# Patient Record
Sex: FEMALE | Race: AMERICAN INDIAN OR ALASKA NATIVE
[De-identification: names, ages, dates, MRNs, and addresses within clinical notes are randomized per-mention and may not be internally consistent; named-entity substitution may affect disease eponyms.]

---

## 2017-07-07 ENCOUNTER — HOSPITAL ENCOUNTER (EMERGENCY)
Dept: HOSPITAL 31 - C.ER | Age: 57
Discharge: HOME | End: 2017-07-07
Payer: COMMERCIAL

## 2017-07-07 VITALS — HEART RATE: 78 BPM | SYSTOLIC BLOOD PRESSURE: 138 MMHG | RESPIRATION RATE: 16 BRPM | DIASTOLIC BLOOD PRESSURE: 75 MMHG

## 2017-07-07 VITALS — OXYGEN SATURATION: 96 %

## 2017-07-07 VITALS — TEMPERATURE: 98 F

## 2017-07-07 VITALS — BODY MASS INDEX: 26.6 KG/M2

## 2017-07-07 DIAGNOSIS — S39.012A: Primary | ICD-10-CM
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
## 2017-07-07 DIAGNOSIS — S09.90XA: ICD-10-CM

## 2017-07-07 DIAGNOSIS — S16.1XXA: ICD-10-CM
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
## 2018-03-28 ENCOUNTER — HOSPITAL ENCOUNTER (EMERGENCY)
Dept: HOSPITAL 42 - ED | Age: 58
Discharge: HOME | End: 2018-03-28
Payer: MEDICAID

## 2018-03-28 VITALS — BODY MASS INDEX: 26.6 KG/M2

## 2018-03-28 DIAGNOSIS — K92.1: Primary | ICD-10-CM

## 2018-03-28 DIAGNOSIS — I10: ICD-10-CM

## 2018-03-28 LAB
ALBUMIN SERPL-MCNC: 4.5 G/DL (ref 3–4.8)
ALBUMIN/GLOB SERPL: 1.3 {RATIO} (ref 1.1–1.8)
ALT SERPL-CCNC: 22 U/L (ref 7–56)
AMORPH SED URNS QL MICRO: (no result)
APPEARANCE UR: CLEAR
AST SERPL-CCNC: 20 U/L (ref 14–36)
BACTERIA #/AREA URNS HPF: (no result) /[HPF]
BASOPHILS # BLD AUTO: 0.03 K/MM3 (ref 0–2)
BASOPHILS NFR BLD: 0.6 % (ref 0–3)
BILIRUB UR-MCNC: NEGATIVE MG/DL
BUN SERPL-MCNC: 19 MG/DL (ref 7–21)
CALCIUM SERPL-MCNC: 9.9 MG/DL (ref 8.4–10.5)
COLOR UR: YELLOW
EOSINOPHIL # BLD: 0 10*3/UL (ref 0–0.7)
EOSINOPHIL NFR BLD: 0.7 % (ref 1.5–5)
ERYTHROCYTE [DISTWIDTH] IN BLOOD BY AUTOMATED COUNT: 12.7 % (ref 11.5–14.5)
GFR NON-AFRICAN AMERICAN: > 60
GLUCOSE UR STRIP-MCNC: NEGATIVE MG/DL
GRANULOCYTES # BLD: 2.37 10*3/UL (ref 1.4–6.5)
GRANULOCYTES NFR BLD: 43.6 % (ref 50–68)
HGB BLD-MCNC: 11.9 G/DL (ref 12–16)
INR PPP: 1.02 (ref 0.93–1.08)
LEUKOCYTE ESTERASE UR-ACNC: (no result) LEU/UL
LIPASE SERPL-CCNC: 82 U/L (ref 23–300)
LYMPHOCYTES # BLD: 2.7 10*3/UL (ref 1.2–3.4)
LYMPHOCYTES NFR BLD AUTO: 49 % (ref 22–35)
MCH RBC QN AUTO: 27.7 PG (ref 25–35)
MCHC RBC AUTO-ENTMCNC: 33.8 G/DL (ref 31–37)
MCV RBC AUTO: 81.9 FL (ref 80–105)
MONOCYTES # BLD AUTO: 0.3 10*3/UL (ref 0.1–0.6)
MONOCYTES NFR BLD: 6.1 % (ref 1–6)
PH UR STRIP: 7 [PH] (ref 4.7–8)
PLATELET # BLD: 297 10^3/UL (ref 120–450)
PMV BLD AUTO: 10.4 FL (ref 7–11)
PROT UR STRIP-MCNC: NEGATIVE MG/DL
PROTHROMBIN TIME: 11.6 SECONDS (ref 9.4–12.5)
RBC # BLD AUTO: 4.3 10^6/UL (ref 3.5–6.1)
RBC # UR STRIP: (no result) /UL
SP GR UR STRIP: 1.01 (ref 1–1.03)
UROBILINOGEN UR STRIP-ACNC: 1 E.U./DL
WBC # BLD AUTO: 5.4 10^3/UL (ref 4.5–11)

## 2018-03-28 PROCEDURE — 81001 URINALYSIS AUTO W/SCOPE: CPT

## 2018-03-28 PROCEDURE — 85610 PROTHROMBIN TIME: CPT

## 2018-03-28 PROCEDURE — 74177 CT ABD & PELVIS W/CONTRAST: CPT

## 2018-03-28 PROCEDURE — 96360 HYDRATION IV INFUSION INIT: CPT

## 2018-03-28 PROCEDURE — 85025 COMPLETE CBC W/AUTO DIFF WBC: CPT

## 2018-03-28 PROCEDURE — 80053 COMPREHEN METABOLIC PANEL: CPT

## 2018-03-28 PROCEDURE — 87086 URINE CULTURE/COLONY COUNT: CPT

## 2018-03-28 PROCEDURE — 99283 EMERGENCY DEPT VISIT LOW MDM: CPT

## 2018-03-28 PROCEDURE — 83690 ASSAY OF LIPASE: CPT

## 2018-03-28 NOTE — CT
EXAM:

  CT Abdomen and Pelvis With Intravenous Contrast



EXAM DATE/TIME:

  3/28/2018 4:36 PM



CLINICAL HISTORY:

  The patient age is 57 years old and is female; Pain and signs and symptoms; 

Other: Rectal bleeding and mid-low abd pain; Abdominal pain; Localized; Lower; 

Prior surgery; Surgery date: 6+ months; Surgery type: Tubal ligation, 

hemorrhoid surgery; Additional info: Lower abdominal pain and bright red blood 

per rect Facility exam id and description: Ct abdpelc abd pelvis po iv contrast



TECHNIQUE:

  Axial computed tomography images of the abdomen and pelvis with intravenous 

contrast.  All CT scans at this facility use one or more dose reduction 

techniques, viz.: automated exposure control; ma/kV adjustment per patient size 

(including targeted exams where dose is matched to indication; i.e. head); or 

iterative reconstruction technique.

  Coronal and sagittal reformatted images were created and reviewed.



CONTRAST:

  95 mL of omnipaque 350 administered intravenously.



COMPARISON:

  No relevant prior studies available.



FINDINGS:

  Lower thorax:  Atelectatic changes are visualized at the bilateral lung bases.



 ABDOMEN:

  Liver:  No mass.

  Gallbladder and bile ducts:  The gallbladder is contracted. Minimal increased 

density is seen within the dependent gallbladder, and a gallstone cannot be 

excluded.

  Pancreas:  Normal contour, without acute peripancreatic stranding.

  Spleen:  A splenule is visualized. No splenomegaly.

  Adrenals:  No mass.

  Kidneys and ureters:  Hypodense small bilateral renal cysts are identified. A 

few tiny nonobstructing renal calculi are seen within the kidneys, without 

hydronephrosis bilaterally.

  Stomach and bowel:  There is mild wall thickening at the rectosigmoid 

junction, likely due to incomplete distention or proctocolitis. Additional 

pathology cannot be excluded.  Moderate fecal material is identified within the 

colon. There is no visualized distention of the small or large bowel.

  Appendix:  The appendix is poorly visualized.



 PELVIS:

  Bladder:  No mass.

  Reproductive:  Unremarkable as visualized.



 ABDOMEN and PELVIS:

  Intraperitoneal space:  There is a small amount of free fluid within the 

pelvis.

  Bones/joints:  There is a mild compression fracture of the L5 vertebral body 

involving the inferior endplate. This is indeterminate in acuity.  There is a 

subcentimeter nonspecific sclerotic lesion within the left iliac bone.  

Hypertrophic degenerative changes are noted within the spine.

  Soft tissues:  There is a small fat containing umbilical hernia.  There is a 

tubular area of the peripheral calcification or stent anterior to the left 

psoas muscle.

  Vasculature:  No abdominal aortic aneurysm.

  Lymph nodes:  No enlarged lymph nodes.



IMPRESSION:     

1.  There is mild wall thickening at the rectosigmoid junction, likely due to 

incomplete distention or proctocolitis. Additional pathology cannot be 

excluded. Further clinical evaluation is recommended.

2.  There is a small amount of free fluid within the pelvis.

3.  Hypodense small bilateral renal cysts are identified. A few tiny 

nonobstructing renal calculi are seen within the kidneys, without 

hydronephrosis bilaterally.

4.  There is a mild compression fracture of the L5 vertebral body involving the 

inferior endplate. This is indeterminate in acuity.

5.  Additional CT findings described above.

## 2018-03-28 NOTE — ED PDOC
Arrival/HPI





- General


Chief Complaint: GI Problem


Time Seen by Provider: 03/28/18 16:21


Historian: Patient





- History of Present Illness


Narrative History of Present Illness (Text): 


03/28/18 16:41


A 57 year old female presents to the emergency department complaining of rectal 

bleeding. Patient reports she recently had external hemorrhoids removed and 

internal hemorrhoids removed 6 months ago in October. Patient notes mild lower 

abdominal pain but denies any fever, chills, nausea, vomiting, diarrhea, chest 

pain, shortness of breath or any other complaints. 


 





Past Medical History





- Provider Review


Nursing Documentation Reviewed: Yes





- Infectious Disease


Hx of Infectious Diseases: None





- Tetanus Immunization


Tetanus Immunization: Unknown





- Reproductive


Menopause: Yes





- Cardiac


Hx Cardiac Disorders: Yes


Hx Hypertension: Yes





- Pulmonary


Hx Respiratory Disorders: No





- Neurological


Hx Neurological Disorder: No





- HEENT


Hx HEENT Disorder: No





- Renal


Hx Renal Disorder: No





- Endocrine/Metabolic


Hx Endocrine Disorders: No





- Hematological/Oncological


Hx Blood Disorders: No





- Integumentary


Hx Dermatological Disorder: No





- Musculoskeletal/Rheumatological


Hx Musculoskeletal Disorders: No





- Gastrointestinal


Hx Gastrointestinal Disorders: No





- Genitourinary/Gynecological


Hx Genitourinary Disorders: No





- Psychiatric


Hx Psychophysiologic Disorder: No


Hx Substance Use: No





- Past Surgical History


Past Surgical History: No Previous





- Surgical History


Hx Tubal Ligation: Yes


Other/Comment: HEMMORHOID REMOVED





- Anesthesia


Hx Anesthesia: Yes


Hx Anesthesia Reactions: No





- Suicidal Assessment


Feels Threatened In Home Enviroment: No





Family/Social History





- Physician Review


Nursing Documentation Reviewed: Yes


Family/Social History: No Known Family HX


Smoking Status: Never Smoked


Hx Alcohol Use: Yes (SOCIALLY)


Hx Substance Use: No


Hx Substance Use Treatment: No





Allergies/Home Meds


Allergies/Adverse Reactions: 


Allergies





No Known Allergies Allergy (Verified 03/28/18 16:03)


 








Home Medications: 


 Home Meds











 Medication  Instructions  Recorded  Confirmed


 


Docusate [Colace] 1 cap PO DAILY 03/28/18 03/28/18


 


NIFEdipine ER [Procardia XL] 90 mg PO DAILY 03/28/18 03/28/18














Review of Systems





- Physician Review


All systems were reviewed & negative as marked: Yes





- Review of Systems


Constitutional: absent: Fevers, Night Sweats


Respiratory: absent: SOB


Cardiovascular: absent: Chest Pain


Gastrointestinal: Abdominal Pain, Hematochezia.  absent: Diarrhea, Nausea, 

Vomiting





Physical Exam


Vital Signs Reviewed: Yes


Vital Signs











  Temp Pulse Resp BP Pulse Ox


 


 03/28/18 16:05  98.6 F  82  16  106/72  98











Temperature: Afebrile


Blood Pressure: Normal


Pulse: Regular


Respiratory Rate: Normal


Appearance: Positive for: Well-Appearing, Non-Toxic, Comfortable


Pain Distress: None


Mental Status: Positive for: Alert and Oriented X 3





- Systems Exam


Head: Present: Atraumatic, Normocephalic


Pupils: Present: PERRL


Extroacular Muscles: Present: EOMI


Conjunctiva: Present: Normal


Mouth: Present: Moist Mucous Membranes


Neck: Present: Normal Range of Motion


Respiratory/Chest: Present: Clear to Auscultation, Good Air Exchange.  No: 

Respiratory Distress, Accessory Muscle Use


Cardiovascular: Present: Regular Rate and Rhythm, Normal S1, S2.  No: Murmurs


Abdomen: Present: Normal Bowel Sounds.  No: Tenderness, Distention, Peritoneal 

Signs


Rectal: Present: Hemorrhoids (External hemorrhoids noted, Non-thrombosed, No 

active bleeding. No palpable internal hemorrhoids), Normal Rectal Tone.  No: 

Occult Blood (No blood in rectal vault), Rectal Tenderness


Upper Extremity: Present: Normal Inspection.  No: Cyanosis, Edema


Lower Extremity: Present: Normal Inspection.  No: Edema


Neurological: Present: GCS=15, CN II-XII Intact, Speech Normal


Skin: Present: Warm, Dry, Normal Color.  No: Rashes


Psychiatric: Present: Alert, Oriented x 3, Normal Insight, Normal Concentration





Medical Decision Making


ED Course and Treatment: 


03/28/18 16:41


Impression:


A 57 year old female with rectal bleeding. Patient notes lower abdominal pain.





Differential Diagnosis included but are not limited to: Rule out Diverticulitis





Plan:


-- Abdomen and pelvis CT


-- Labs


-- Urinalysis 


-- IV fluids


-- Reassess and disposition





Progress Notes:


Report Date : 03/28/2018 20:12:00


EXAM: CT Abdomen and Pelvis With Intravenous Contrast  


Dictated By:  Miguel Angel Colbert MD, MD  


IMPRESSION:       


1.  There is mild wall thickening at the rectosigmoid junction, likely due to 

incomplete distention or proctocolitis. Additional pathology cannot be 

excluded. Further clinical evaluation is recommended.  


2.  There is a small amount of free fluid within the pelvis.  


3.  Hypodense small bilateral renal cysts are identified. A few tiny 

nonobstructing renal calculi are seen within the kidneys, without 

hydronephrosis bilaterally.  


4.  There is a mild compression fracture of the L5 vertebral body involving the 

inferior endplate. This is indeterminate in acuity.  


5.  Additional CT findings described above.  


 








- Lab Interpretations


Lab Results: 








 03/28/18 17:05 





 03/28/18 17:05 





 Lab Results





03/28/18 17:05: Sodium 142, Potassium 3.9, Chloride 103, Carbon Dioxide 30, 

Anion Gap 13, BUN 19, Creatinine 0.9, Est GFR (African Amer) > 60, Est GFR (Non-

Af Amer) > 60, Random Glucose 92, Calcium 9.9, Total Bilirubin 0.4, AST 20, ALT 

22, Alkaline Phosphatase 85, Total Protein 7.9, Albumin 4.5, Globulin 3.5, 

Albumin/Globulin Ratio 1.3, Lipase 82


03/28/18 17:05: Urine Color Yellow, Urine Appearance Clear, Urine pH 7.0, Ur 

Specific Gravity 1.010, Urine Protein Negative, Urine Glucose (UA) Negative, 

Urine Ketones Negative, Urine Blood Trace-intact H, Urine Nitrate Negative, 

Urine Bilirubin Negative, Urine Urobilinogen 1.0 H, Ur Leukocyte Esterase Small 

H, Urine RBC 1 - 3, Urine WBC 2 - 5, Ur Epithelial Cells 4 - 5, Amorphous 

Sediment Few, Urine Bacteria Mod


03/28/18 17:05: PT 11.6, INR 1.02


03/28/18 17:05: WBC 5.4, RBC 4.30, Hgb 11.9 L, Hct 35.2 L, MCV 81.9, MCH 27.7, 

MCHC 33.8, RDW 12.7, Plt Count 297, MPV 10.4, Gran % 43.6 L, Lymph % (Auto) 

49.0 H, Mono % (Auto) 6.1 H, Eos % (Auto) 0.7 L, Baso % (Auto) 0.6, Gran # 2.37

, Lymph # (Auto) 2.7, Mono # (Auto) 0.3, Eos # (Auto) 0.0, Baso # (Auto) 0.03








I have reviewed the lab results: Yes





- RAD Interpretation


Radiology Orders: 








03/28/18 16:36


ABD PELVIS PO & IV CONTRAST [CT] Stat 














- Medication Orders


Current Medication Orders: 











Discontinued Medications





Sodium Chloride (Sodium Chloride 0.9%)  1,000 mls @ 999 mls/hr IV .Q1H1M STA


   Stop: 03/28/18 17:36


   Last Admin: 03/28/18 17:17  Dose: 999 mls/hr





eMAR Start Stop


 Document     03/28/18 17:17  CMA  (Rec: 03/28/18 17:18  CMA  [id number])


     Intravenous Solution


      Start Date                                 03/28/18


      Start Time                                 17:17


      End Date                                   03/28/18


      End time                                   18:17


      Total Infusion Time                        60














- PA / NP / Resident Statement


MD/DO has reviewed & agrees with the documentation as recorded.





- Scribe Statement


The provider has reviewed the documentation as recorded by the Scribe


Tri Steward





Provider Scribe Attestation:


All medical record entries made by the Scribe were at my direction and 

personally dictated by me. I have reviewed the chart and agree that the record 

accurately reflects my personal performance of the history, physical exam, 

medical decision making, and the department course for this patient. I have 

also personally directed, reviewed, and agree with the discharge instructions 

and disposition.








Disposition/Present on Arrival





- Present on Arrival


Any Indicators Present on Arrival: No


History of DVT/PE: No


History of Uncontrolled Diabetes: No


Urinary Catheter: No


History of Decub. Ulcer: No


History Surgical Site Infection Following: None





- Disposition


Have Diagnosis and Disposition been Completed?: Yes


Diagnosis: 


 Hematochezia





Disposition: HOME/ ROUTINE


Disposition Time: 20:46


Patient Plan: Discharge


Condition: GOOD


Discharge Instructions (ExitCare):  Bloody Stools, Adult (DC)


Additional Instructions: 


[place]-





All of your test results are negative.  There was no blood in your rectum when 

we did the exam and the CT scan does not show any cause for the bleeding so it 

is important that you follow up with the doctor who did your hemorrhoid 

surgery.  Call first thing in the morning.





Return to us if any problems.





Parish-


Dr. Tony Whitfield


Referrals: 


[facility] Lexi Reday, [Primary Care Provider] - Follow up with primary


Forms:  [facility] (English)

## 2018-03-29 VITALS
TEMPERATURE: 98.6 F | OXYGEN SATURATION: 99 % | SYSTOLIC BLOOD PRESSURE: 113 MMHG | HEART RATE: 72 BPM | RESPIRATION RATE: 16 BRPM | DIASTOLIC BLOOD PRESSURE: 66 MMHG

## 2019-03-11 ENCOUNTER — HOSPITAL ENCOUNTER (EMERGENCY)
Dept: HOSPITAL 31 - C.ER | Age: 59
Discharge: HOME | End: 2019-03-11
Payer: COMMERCIAL

## 2019-03-11 VITALS
DIASTOLIC BLOOD PRESSURE: 78 MMHG | TEMPERATURE: 99.1 F | OXYGEN SATURATION: 100 % | SYSTOLIC BLOOD PRESSURE: 140 MMHG | RESPIRATION RATE: 20 BRPM | HEART RATE: 81 BPM

## 2019-03-11 VITALS — BODY MASS INDEX: 26.5 KG/M2

## 2019-03-11 DIAGNOSIS — M54.2: Primary | ICD-10-CM

## 2019-03-11 DIAGNOSIS — M79.601: ICD-10-CM

## 2019-03-11 PROCEDURE — 96372 THER/PROPH/DIAG INJ SC/IM: CPT

## 2019-03-11 PROCEDURE — 99283 EMERGENCY DEPT VISIT LOW MDM: CPT
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
